# Patient Record
Sex: FEMALE | Race: ASIAN | NOT HISPANIC OR LATINO
[De-identification: names, ages, dates, MRNs, and addresses within clinical notes are randomized per-mention and may not be internally consistent; named-entity substitution may affect disease eponyms.]

---

## 2024-04-03 ENCOUNTER — NON-APPOINTMENT (OUTPATIENT)
Age: 59
End: 2024-04-03

## 2024-04-04 PROBLEM — G93.89 BRAIN MASS: Status: ACTIVE | Noted: 2024-04-04

## 2024-04-08 PROBLEM — G93.6 VASOGENIC EDEMA: Status: ACTIVE | Noted: 2024-04-08

## 2024-04-11 ENCOUNTER — APPOINTMENT (OUTPATIENT)
Dept: NEUROSURGERY | Facility: CLINIC | Age: 59
End: 2024-04-11
Payer: COMMERCIAL

## 2024-04-11 VITALS
OXYGEN SATURATION: 98 % | WEIGHT: 136 LBS | DIASTOLIC BLOOD PRESSURE: 73 MMHG | RESPIRATION RATE: 18 BRPM | TEMPERATURE: 97.7 F | HEIGHT: 65 IN | HEART RATE: 75 BPM | SYSTOLIC BLOOD PRESSURE: 112 MMHG | BODY MASS INDEX: 22.66 KG/M2

## 2024-04-11 DIAGNOSIS — G93.6 CEREBRAL EDEMA: ICD-10-CM

## 2024-04-11 DIAGNOSIS — G93.89 OTHER SPECIFIED DISORDERS OF BRAIN: ICD-10-CM

## 2024-04-11 PROCEDURE — 99204 OFFICE O/P NEW MOD 45 MIN: CPT

## 2024-04-11 NOTE — HISTORY OF PRESENT ILLNESS
[de-identified] : 57 y/o female with PMHx of   who presents for evaluation of brain mass, referred by neurologist Dr. Kimmie Quinn.  Patient reports for approximately one year has been having dizziness and balance problems. She has had 3 falls-last fall Dec. 2023.  She also has frontal headaches.  Patient saw her PCP who then referred her to a neurologist for a brain MRI.  She is an  and has noticed she is not functioning as efficiently as she used to.   Completed MRI brain w/o contrast on 3/26/24 which showed 3 cm brain mass right frontal superior medical aspect with atypical features causing mass effect with vasogenic edema.     Neurologist: Kimmie Quinn

## 2024-04-11 NOTE — ASSESSMENT
[FreeTextEntry1] : 58F with pmhx of recent falls and headaches found to have 3x3 right frontal extra-axial mass concerning for meningioma with significant surrounding edema. No contrast on MRI. Will obtain contrast-enhanced MRI. I recommended surgery at this time given extent of edema and likelihood of progression over time. The patient will obtain MRI and consider that vs. surveillance.   Dr. D'Amico independently reviewed all available images with patient.   PLAN: - Obtain MRI brain with contrast -Patient and spouse would like to think about surgery at the time   Patient verbalizes understanding of today's discussion and next steps in treatment plan.     A total of 45 minutes was spent reviewing the labs, imaging and physical examination of the patient. We discussed the diagnosis, and the plan. The patient's questions were answered. The patient demonstrated an excellent understanding of the plan.

## 2024-04-11 NOTE — RESULTS/DATA
[FreeTextEntry1] : Brain MRI Impression:  Right frontal superior anterior medial aspect 3 cm probable meningioma, with atypical features, causing mass effect on the subjacent frontal lobe and moderate amount of T2 hyperintense vasogenic type edema. Minimal midline shift to the left. No evidence of transtentorial herniation.

## 2024-04-11 NOTE — PHYSICAL EXAM
[Impaired Insight] : insight and judgment were intact [Mood] : the mood was normal [Memory Remote] : remote memory was not impaired [Person] : oriented to person [Place] : oriented to place [Time] : oriented to time [Motor Tone] : muscle tone was normal in all four extremities [Motor Strength] : muscle strength was normal in all four extremities [Abnormal Walk] : normal gait [Limited Balance] : the patient's balance was impaired

## 2024-04-22 ENCOUNTER — APPOINTMENT (OUTPATIENT)
Dept: MRI IMAGING | Facility: HOSPITAL | Age: 59
End: 2024-04-22

## 2024-04-22 ENCOUNTER — OUTPATIENT (OUTPATIENT)
Dept: OUTPATIENT SERVICES | Facility: HOSPITAL | Age: 59
LOS: 1 days | End: 2024-04-22
Payer: COMMERCIAL

## 2024-04-22 PROCEDURE — A9585: CPT

## 2024-04-22 PROCEDURE — 70552 MRI BRAIN STEM W/DYE: CPT

## 2024-04-22 PROCEDURE — 70552 MRI BRAIN STEM W/DYE: CPT | Mod: 26

## 2024-05-08 ENCOUNTER — NON-APPOINTMENT (OUTPATIENT)
Age: 59
End: 2024-05-08

## 2024-05-08 NOTE — DATA REVIEWED
[de-identified] : ACC: [id number]     EXAM:  MR BRAIN IC   ORDERED BY: KINGS APONTE  PROCEDURE DATE:  04/22/2024    INTERPRETATION:  Exam Date: 4/22/2024 12:31 PM  MR brain with and without gadolinium  CLINICAL INFORMATION:   brain mass;  TECHNIQUE:   Multiplanar imaging of the brain was performed pre- and post-IV contrast.   7.5 cc of Gadavist was inserted. 0 cc was discarded.  COMPARISON: Outside MRI obtained 3/26/2024.  FINDINGS:  Avidly enhancing dural based mass lesion overlying the right anterior superior frontal convexity with associated dural tails measuring approximately 3.3 cm AP by 3.0 cm TR by 2.6 cm cc. There is a large degree of surrounding vasogenic edema involving the right superior and anterior frontal lobes. There is mass effect on the surrounding parenchyma and mild left to right midline shift of approximately 4 mm.  Multiple scattered small foci of T2/FLAIR hyperintense signal in the periventricular and subcortical white matter of the bilateral cerebri, suggestive of mild chronic microvascular ischemic changes. No acute infarct or hemorrhage. No hydrocephalus.  Paranasal sinuses and mastoid air cells are clear.  IMPRESSION:  Avidly enhancing dural based mass lesion overlying the right anterior superior frontal convexity, with associated dural tails, with secondary mass effect and small degree of right to left midline shift as above.  These findings are statistically suggestive of an aggressive meningioma, however a dural metastasis or solitary fibrous tumor of the dura cannot be entirely excluded. CT head with thin bony cuts at the region of tumor may be helpful for further evaluation of the adjacent calvarium and narrowing of the differential.  --- End of Report ---      BOB LUIS This document has been electronically signed. Apr 30 2024  3:16PM

## 2024-05-08 NOTE — HISTORY OF PRESENT ILLNESS
[FreeTextEntry1] : 59 y/o female who was found to have 3 x 3 cm right frontal extra-axial brain mass during workup for falls and headaches with neurologist Dr. Kimmie Quinn.   - Patient reports for approximately one year has been having dizziness and balance problems. She has had 3 falls-last fall Dec. 2023. Also headaches. Works as  and noticed not functioned as efficiently as she had in the past.  - She saw her PCP who then referred her to a neurologist for a brain MRI. She saw neurologist Dr. Quinn and completed MRI brain w/o contrast on 3/26/24 which showed 3 cm brain mass right frontal superior medical aspect with atypical features causing mass effect with vasogenic edema. Referred to neurosurgery.   - 4/11/24 pt presented for evaluation. Surgical intervention discussed and patient wished to continue to watch. MRI with contrast ordered to be repeated.   Pt returns TODAY for follow- up and review of MRI.    Neurologist: Kimmie Quinn

## 2024-05-16 ENCOUNTER — NON-APPOINTMENT (OUTPATIENT)
Age: 59
End: 2024-05-16